# Patient Record
Sex: MALE | URBAN - METROPOLITAN AREA
[De-identification: names, ages, dates, MRNs, and addresses within clinical notes are randomized per-mention and may not be internally consistent; named-entity substitution may affect disease eponyms.]

---

## 2022-05-20 ENCOUNTER — OFFICE VISIT (OUTPATIENT)
Dept: URBAN - METROPOLITAN AREA CLINIC 35 | Facility: CLINIC | Age: 42
End: 2022-05-20
Payer: COMMERCIAL

## 2022-05-20 VITALS
DIASTOLIC BLOOD PRESSURE: 106 MMHG | SYSTOLIC BLOOD PRESSURE: 142 MMHG | BODY MASS INDEX: 33 KG/M2 | HEIGHT: 73 IN | WEIGHT: 249 LBS

## 2022-05-20 DIAGNOSIS — R10.84 GENERALIZED ABDOMINAL PAIN: ICD-10-CM

## 2022-05-20 DIAGNOSIS — R19.7 DIARRHEA, UNSPECIFIED TYPE: ICD-10-CM

## 2022-05-20 PROCEDURE — 99204 OFFICE O/P NEW MOD 45 MIN: CPT | Performed by: INTERNAL MEDICINE

## 2022-05-20 RX ORDER — AMOXICILLIN AND CLAVULANATE POTASSIUM 875; 125 MG/1; MG/1
1 TABLET TABLET, FILM COATED ORAL
Status: ACTIVE | COMMUNITY

## 2022-05-20 NOTE — HPI-CHANGE IN BOWEL HABITS
Patient presents for change in bowel habits consult . Pt admits symptoms have been long standing .  Patient admits normally he has 2  bowel movements a day with soft stools.   He states sporadically hell have fecal urgency with watery stools after certain foods. Admits taking recent antibiotics for sinus infection but states symptoms have been present before abx intake . Patient denies any recent stools studies . Patient denies blood in stools, melena , or mucus . Patient denies previous colonoscopy .

## 2022-05-20 NOTE — HPI-ABDOMINAL PAIN
41 year old male patient presents for abdominal pain consult. Patient admits he has been experiencing symptoms for longstanding period of time . Patient describes symptoms as cramping /pinching .   Patient states symptoms are located RUQ.   Patient admits symptoms are more present during /after mels or when he is having episde of diarrhea . Patient denies nausea or vomiting. Patient denies having any recent imaging.  Patient denies EGD in the past.

## 2022-05-30 LAB
A/G RATIO: 2.1
ALBUMIN: 4.9
ALKALINE PHOSPHATASE: 29
ALT (SGPT): 76
AST (SGOT): 43
BILIRUBIN, TOTAL: 0.8
BUN/CREATININE RATIO: 11
BUN: 14
CALCIUM: 10
CARBON DIOXIDE, TOTAL: 22
CHLORIDE: 101
CREATININE: 1.27
DEAMIDATED GLIADIN ABS, IGA: 7
DEAMIDATED GLIADIN ABS, IGG: 1
EGFR: 72
ENDOMYSIAL ANTIBODY IGA: NEGATIVE
F001-IGE EGG WHITE: <0.1
F002-IGE MILK: <0.1
F003-IGE CODFISH: <0.1
F004-IGE WHEAT: <0.1
F008-IGE CORN: <0.1
F010-IGE SESAME SEED: <0.1
F013-IGE PEANUT: <0.1
F014-IGE SOYBEAN: <0.1
F024-IGE SHRIMP: 0.6
F207-IGE CLAM: <0.1
F256-IGE WALNUT: <0.1
F338-IGE SCALLOP: <0.1
GLOBULIN, TOTAL: 2.3
GLUCOSE: 81
IMMUNOGLOBULIN A, QN, SERUM: 303
Lab: (no result)
POTASSIUM: 5
PROTEIN, TOTAL: 7.2
SODIUM: 138
T-TRANSGLUTAMINASE (TTG) IGA: <2
T-TRANSGLUTAMINASE (TTG) IGG: 3
TSH: 2.2

## 2022-06-10 ENCOUNTER — CLAIMS CREATED FROM THE CLAIM WINDOW (OUTPATIENT)
Dept: URBAN - METROPOLITAN AREA CLINIC 35 | Facility: CLINIC | Age: 42
End: 2022-06-10
Payer: COMMERCIAL

## 2022-06-10 ENCOUNTER — LAB OUTSIDE AN ENCOUNTER (OUTPATIENT)
Dept: URBAN - METROPOLITAN AREA CLINIC 35 | Facility: CLINIC | Age: 42
End: 2022-06-10

## 2022-06-10 VITALS
WEIGHT: 246 LBS | HEIGHT: 73 IN | DIASTOLIC BLOOD PRESSURE: 100 MMHG | BODY MASS INDEX: 32.6 KG/M2 | SYSTOLIC BLOOD PRESSURE: 142 MMHG | OXYGEN SATURATION: 97 % | HEART RATE: 78 BPM

## 2022-06-10 DIAGNOSIS — R19.7 DIARRHEA, UNSPECIFIED TYPE: ICD-10-CM

## 2022-06-10 DIAGNOSIS — R74.8 ABNORMAL LIVER ENZYMES: ICD-10-CM

## 2022-06-10 DIAGNOSIS — R10.84 GENERALIZED ABDOMINAL PAIN: ICD-10-CM

## 2022-06-10 PROCEDURE — 99214 OFFICE O/P EST MOD 30 MIN: CPT | Performed by: INTERNAL MEDICINE

## 2022-06-10 RX ORDER — AMOXICILLIN AND CLAVULANATE POTASSIUM 875; 125 MG/1; MG/1
1 TABLET TABLET, FILM COATED ORAL
Status: DISCONTINUED | COMMUNITY

## 2022-06-10 RX ORDER — SODIUM PICOSULFATE, MAGNESIUM OXIDE, AND ANHYDROUS CITRIC ACID 10; 3.5; 12 MG/160ML; G/160ML; G/160ML
ML AS DIRECTED LIQUID ORAL
Qty: 1 KIT | Refills: 0 | OUTPATIENT
Start: 2022-06-10 | End: 2022-06-12

## 2022-06-10 NOTE — HPI-ABDOMINAL PAIN
Patient admits symptoms have subsided .   Of last visit (05/20/2022) 41 year old male patient presents for abdominal pain consult. Patient admits he has been experiencing symptoms for longstanding period of time . Patient describes symptoms as cramping /pinching .   Patient states symptoms are located RUQ.   Patient admits symptoms are more present during /after mels or when he is having episde of diarrhea . Patient denies nausea or vomiting. Patient denies having any recent imaging.  Patient denies EGD in the past.

## 2022-06-10 NOTE — HPI-CHANGE IN BOWEL HABITS
Patient presents for follow up . Patient admits bowel habits are still inconsistent . He admits having 4 bowel movements yesterday morning then today only 2 . He denies any rectal pain. Patient denies rectal bleeding . He had labs completed .    Of last visit (05/20/2022) Patient presents for change in bowel habits consult . Pt admits symptoms have been long standing .  Patient admits normally he has 2  bowel movements a day with soft stools.   He states sporadically hell have fecal urgency with watery stools after certain foods. Admits taking recent antibiotics for sinus infection but states symptoms have been present before abx intake . Patient denies any recent stools studies . Patient denies blood in stools, melena , or mucus . Patient denies previous colonoscopy .

## 2022-07-26 ENCOUNTER — OFFICE VISIT (OUTPATIENT)
Dept: URBAN - METROPOLITAN AREA SURGERY CENTER 8 | Facility: SURGERY CENTER | Age: 42
End: 2022-07-26
Payer: COMMERCIAL

## 2022-07-26 ENCOUNTER — CLAIMS CREATED FROM THE CLAIM WINDOW (OUTPATIENT)
Dept: URBAN - METROPOLITAN AREA CLINIC 4 | Facility: CLINIC | Age: 42
End: 2022-07-26
Payer: COMMERCIAL

## 2022-07-26 DIAGNOSIS — K31.89 OTHER DISEASES OF STOMACH AND DUODENUM: ICD-10-CM

## 2022-07-26 DIAGNOSIS — R19.7 ACUTE DIARRHEA: ICD-10-CM

## 2022-07-26 DIAGNOSIS — K63.89 BACTERIAL OVERGROWTH SYNDROME: ICD-10-CM

## 2022-07-26 PROCEDURE — 45380 COLONOSCOPY AND BIOPSY: CPT | Performed by: INTERNAL MEDICINE

## 2022-07-26 PROCEDURE — G8907 PT DOC NO EVENTS ON DISCHARG: HCPCS | Performed by: INTERNAL MEDICINE

## 2022-07-26 PROCEDURE — 88342 IMHCHEM/IMCYTCHM 1ST ANTB: CPT | Performed by: PATHOLOGY

## 2022-07-26 PROCEDURE — 88305 TISSUE EXAM BY PATHOLOGIST: CPT | Performed by: PATHOLOGY

## 2022-08-19 ENCOUNTER — OFFICE VISIT (OUTPATIENT)
Dept: URBAN - METROPOLITAN AREA CLINIC 35 | Facility: CLINIC | Age: 42
End: 2022-08-19
Payer: COMMERCIAL

## 2022-08-19 ENCOUNTER — LAB OUTSIDE AN ENCOUNTER (OUTPATIENT)
Dept: URBAN - METROPOLITAN AREA CLINIC 35 | Facility: CLINIC | Age: 42
End: 2022-08-19

## 2022-08-19 VITALS — BODY MASS INDEX: 31.94 KG/M2 | HEIGHT: 73 IN | WEIGHT: 241 LBS

## 2022-08-19 DIAGNOSIS — R19.7 DIARRHEA, UNSPECIFIED TYPE: ICD-10-CM

## 2022-08-19 DIAGNOSIS — R74.8 ABNORMAL LIVER ENZYMES: ICD-10-CM

## 2022-08-19 DIAGNOSIS — R10.84 GENERALIZED ABDOMINAL PAIN: ICD-10-CM

## 2022-08-19 DIAGNOSIS — K21.00 GASTROESOPHAGEAL REFLUX DISEASE WITH ESOPHAGITIS, UNSPECIFIED WHETHER HEMORRHAGE: ICD-10-CM

## 2022-08-19 PROBLEM — 166643006: Status: ACTIVE | Noted: 2022-06-10

## 2022-08-19 PROCEDURE — 99214 OFFICE O/P EST MOD 30 MIN: CPT | Performed by: INTERNAL MEDICINE

## 2022-08-19 NOTE — HPI-GERD
patient presents for heartburn consult. Patient states he has been experiencing symptoms for some time now . Patient admits currently taking Prilosec OTC or prescribed medications for relief of symptoms. Patient describes symptoms as dry  cough and phlegmn and states they are most present throughout the day . Patient denies nighttime symptoms. Patient denies nausea or vomiting. Patient denies any associated weight loss. Patient denies EGD in the past. Patient had Barium Swallow completed on 07/18/2022 revealing Severe GE Reflux .

## 2022-09-20 ENCOUNTER — OFFICE VISIT (OUTPATIENT)
Dept: URBAN - METROPOLITAN AREA SURGERY CENTER 8 | Facility: SURGERY CENTER | Age: 42
End: 2022-09-20

## 2022-09-20 ENCOUNTER — CLAIMS CREATED FROM THE CLAIM WINDOW (OUTPATIENT)
Dept: URBAN - METROPOLITAN AREA SURGERY CENTER 8 | Facility: SURGERY CENTER | Age: 42
End: 2022-09-20
Payer: COMMERCIAL

## 2022-09-20 ENCOUNTER — CLAIMS CREATED FROM THE CLAIM WINDOW (OUTPATIENT)
Dept: URBAN - METROPOLITAN AREA CLINIC 4 | Facility: CLINIC | Age: 42
End: 2022-09-20
Payer: COMMERCIAL

## 2022-09-20 DIAGNOSIS — K21.9 ACID REFLUX: ICD-10-CM

## 2022-09-20 DIAGNOSIS — K29.70 GASTRITIS, UNSPECIFIED, WITHOUT BLEEDING: ICD-10-CM

## 2022-09-20 DIAGNOSIS — K31.89 OTHER DISEASES OF STOMACH AND DUODENUM: ICD-10-CM

## 2022-09-20 DIAGNOSIS — K31.89 ACQUIRED DEFORMITY OF DUODENUM: ICD-10-CM

## 2022-09-20 DIAGNOSIS — K21.9 GASTRO-ESOPHAGEAL REFLUX DISEASE WITHOUT ESOPHAGITIS: ICD-10-CM

## 2022-09-20 DIAGNOSIS — R12 BURNING REFLUX: ICD-10-CM

## 2022-09-20 PROCEDURE — 43239 EGD BIOPSY SINGLE/MULTIPLE: CPT | Performed by: INTERNAL MEDICINE

## 2022-09-20 PROCEDURE — 88312 SPECIAL STAINS GROUP 1: CPT | Performed by: PATHOLOGY

## 2022-09-20 PROCEDURE — 88305 TISSUE EXAM BY PATHOLOGIST: CPT | Performed by: PATHOLOGY

## 2022-09-20 PROCEDURE — G8907 PT DOC NO EVENTS ON DISCHARG: HCPCS | Performed by: INTERNAL MEDICINE

## 2022-09-21 ENCOUNTER — TELEPHONE ENCOUNTER (OUTPATIENT)
Dept: URBAN - METROPOLITAN AREA CLINIC 78 | Facility: CLINIC | Age: 42
End: 2022-09-21

## 2022-09-22 LAB
CHOLESTEROL, TOTAL: 177
COMMENT:: (no result)
HDL CHOLESTEROL: 46
LDL CHOL CALC (NIH): 111
LDL/HDL RATIO: 2.4
TRIGLYCERIDES: 108
VLDL CHOLESTEROL CAL: 20

## 2022-10-11 ENCOUNTER — OFFICE VISIT (OUTPATIENT)
Dept: URBAN - METROPOLITAN AREA CLINIC 35 | Facility: CLINIC | Age: 42
End: 2022-10-11
Payer: COMMERCIAL

## 2022-10-11 ENCOUNTER — DASHBOARD ENCOUNTERS (OUTPATIENT)
Age: 42
End: 2022-10-11

## 2022-10-11 VITALS
HEART RATE: 113 BPM | HEIGHT: 73 IN | WEIGHT: 245 LBS | DIASTOLIC BLOOD PRESSURE: 76 MMHG | OXYGEN SATURATION: 96 % | BODY MASS INDEX: 32.47 KG/M2 | SYSTOLIC BLOOD PRESSURE: 120 MMHG

## 2022-10-11 DIAGNOSIS — F43.9 STRESS: ICD-10-CM

## 2022-10-11 DIAGNOSIS — K76.0 FATTY LIVER: ICD-10-CM

## 2022-10-11 DIAGNOSIS — R10.84 GENERALIZED ABDOMINAL PAIN: ICD-10-CM

## 2022-10-11 DIAGNOSIS — K21.00 GASTROESOPHAGEAL REFLUX DISEASE WITH ESOPHAGITIS, UNSPECIFIED WHETHER HEMORRHAGE: ICD-10-CM

## 2022-10-11 DIAGNOSIS — R19.7 DIARRHEA, UNSPECIFIED TYPE: ICD-10-CM

## 2022-10-11 PROBLEM — 62315008: Status: ACTIVE | Noted: 2022-06-10

## 2022-10-11 PROBLEM — 266433003: Status: ACTIVE | Noted: 2022-08-19

## 2022-10-11 PROBLEM — 73595000: Status: ACTIVE | Noted: 2022-10-11

## 2022-10-11 PROBLEM — 102614006: Status: ACTIVE | Noted: 2022-06-10

## 2022-10-11 PROBLEM — 197321007: Status: ACTIVE | Noted: 2022-10-11

## 2022-10-11 PROCEDURE — 99214 OFFICE O/P EST MOD 30 MIN: CPT | Performed by: INTERNAL MEDICINE

## 2022-10-11 RX ORDER — OMEPRAZOLE 40 MG/1
1 CAPSULE 30 MINUTES BEFORE MORNING MEAL CAPSULE, DELAYED RELEASE ORAL ONCE A DAY
Qty: 90 | Refills: 1 | OUTPATIENT
Start: 2022-10-11

## 2022-10-11 NOTE — HPI-GERD
Patient still admits to having severe cases of acid reflux and the has discontinued the use of Prilosec.    Patient presents for heartburn consult. Patient states he has been experiencing symptoms for some time now . Patient admits currently taking Prilosec OTC or prescribed medications for relief of symptoms. Patient describes symptoms as dry  cough and phlegmn and states they are most present throughout the day . Patient denies nighttime symptoms. Patient denies nausea or vomiting. Patient denies any associated weight loss. Patient denies EGD in the past. Patient had Barium Swallow completed on 07/18/2022 revealing Severe GE Reflux .

## 2022-10-11 NOTE — HPI-ABDOMINAL PAIN
41 year old patient presents today for follow up to his EGD.  Since the procedure patient admits dysphagia, Globus, changes in appetite, and changes in bowel habits. He states that he consistently coughs up mucus costantly (x30) and on a daily bases.     Dr. Mckeon (9-) EGD report shows :  Z-line regular. - Normal esophagus. - Erythematous mucosa in the antrum.   - Normal examined duodenum.  - Multiple biopsies were obtained in the middle third of the esophagus and in the lower third of the esophagus.     Patient admits symptoms have subsided .   Of last visit (05/20/2022) 41 year old male patient presents for abdominal pain consult. Patient admits he has been experiencing symptoms for longstanding period of time . Patient describes symptoms as cramping /pinching .   Patient states symptoms are located RUQ.   Patient admits symptoms are more present during /after mels or when he is having episde of diarrhea . Patient denies nausea or vomiting. Patient denies having any recent imaging.  Patient denies EGD in the past.

## 2022-10-11 NOTE — HPI-CHANGE IN BOWEL HABITS
Patient presents for follow up . Patient admits bowel habits are still inconsistent . He admits having 4 bowel movements yesterday (diarreha) Patient staes that his stools are loose and watery. Morning then today only 2 . He denies any rectal pain. Patient denies rectal bleeding . He had labs completed . He states that his stools are foul smelling at times. He denies any blood, melena or rectal itching.   Of last visit (05/20/2022) Patient presents for change in bowel habits consult . Pt admits symptoms have been long standing .  Patient admits normally he has 2  bowel movements a day with soft stools.   He states sporadically hell have fecal urgency with watery stools after certain foods. Admits taking recent antibiotics for sinus infection but states symptoms have been present before abx intake . Patient denies any recent stools studies . Patient denies blood in stools, melena , or mucus . Patient denies previous colonoscopy .

## 2022-12-30 ENCOUNTER — OFFICE VISIT (OUTPATIENT)
Dept: URBAN - METROPOLITAN AREA CLINIC 35 | Facility: CLINIC | Age: 42
End: 2022-12-30